# Patient Record
Sex: FEMALE | Race: WHITE | NOT HISPANIC OR LATINO
[De-identification: names, ages, dates, MRNs, and addresses within clinical notes are randomized per-mention and may not be internally consistent; named-entity substitution may affect disease eponyms.]

---

## 2021-02-04 PROBLEM — Z00.00 ENCOUNTER FOR PREVENTIVE HEALTH EXAMINATION: Status: ACTIVE | Noted: 2021-02-04

## 2021-02-18 ENCOUNTER — APPOINTMENT (OUTPATIENT)
Dept: NEUROSURGERY | Facility: CLINIC | Age: 76
End: 2021-02-18
Payer: MEDICARE

## 2021-02-18 VITALS — BODY MASS INDEX: 35.88 KG/M2 | WEIGHT: 195 LBS | HEIGHT: 62 IN

## 2021-02-18 DIAGNOSIS — Z86.69 PERSONAL HISTORY OF OTHER DISEASES OF THE NERVOUS SYSTEM AND SENSE ORGANS: ICD-10-CM

## 2021-02-18 DIAGNOSIS — Z63.5 DISRUPTION OF FAMILY BY SEPARATION AND DIVORCE: ICD-10-CM

## 2021-02-18 DIAGNOSIS — I10 ESSENTIAL (PRIMARY) HYPERTENSION: ICD-10-CM

## 2021-02-18 DIAGNOSIS — H93.A9 PULSATILE TINNITUS, UNSPECIFIED EAR: ICD-10-CM

## 2021-02-18 DIAGNOSIS — Z78.9 OTHER SPECIFIED HEALTH STATUS: ICD-10-CM

## 2021-02-18 DIAGNOSIS — Z92.3 PERSONAL HISTORY OF IRRADIATION: ICD-10-CM

## 2021-02-18 DIAGNOSIS — Z86.39 PERSONAL HISTORY OF OTHER ENDOCRINE, NUTRITIONAL AND METABOLIC DISEASE: ICD-10-CM

## 2021-02-18 PROCEDURE — 99203 OFFICE O/P NEW LOW 30 MIN: CPT | Mod: 95

## 2021-02-18 RX ORDER — LEVOTHYROXINE SODIUM 137 UG/1
TABLET ORAL
Refills: 0 | Status: ACTIVE | COMMUNITY

## 2021-02-18 RX ORDER — HYDROCHLOROTHIAZIDE 25 MG/1
25 TABLET ORAL
Refills: 0 | Status: ACTIVE | COMMUNITY

## 2021-02-18 RX ORDER — LOSARTAN POTASSIUM 50 MG/1
50 TABLET, FILM COATED ORAL
Refills: 0 | Status: ACTIVE | COMMUNITY

## 2021-02-18 RX ORDER — PITAVASTATIN CALCIUM 4.18 MG/1
TABLET, FILM COATED ORAL
Refills: 0 | Status: ACTIVE | COMMUNITY

## 2021-02-18 SDOH — SOCIAL STABILITY - SOCIAL INSECURITY: DISRUPTION OF FAMILY BY SEPARATION AND DIVORCE: Z63.5

## 2021-02-23 NOTE — REVIEW OF SYSTEMS
[As Noted in HPI] : as noted in HPI [Negative] : Heme/Lymph [de-identified] : Left Head Pressure [FreeTextEntry4] : Pulsatile Tinnitus

## 2021-02-23 NOTE — DATA REVIEWED
[de-identified] : M [de-identified] : Multiple Imaging Studies Reviewed via Monica Brito\par MRA Head 2019; No aVM or DAVF\par MRA Neck 2019: No stenosis\par MRI 2017: ? 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

## 2021-02-23 NOTE — ASSESSMENT
[FreeTextEntry1] : IMPRESSION:\par Pulsatile tinnitus\par \par We discussed possible causes of pulsatile tinnitus. These include:\par ENT causes such as semicircular canal dehiscence.\par Cardiac causes such as aortic stenosis, valve disease, heart murmur.\par Hypertension.\par Anemia.\par Thyroid disease.\par Cerebrovascular problems: arteriovenous malformation (AVM), dural arteriovenous fistula (DAVF), Venous sinus stenosis, venous aneurysm, large trans-mastoid emissary vein, carotid stenosis.\par Idiopathic Intracranial Hypertension\par \par MRA Neck and Head and MRV Head are unremarkable.\par \par I recommended MRA Head with and without contrast for further assessment (TRICKS)\par \par PLAN:\par 1. MRA Head with and without contrast (TRICKS) to rule out occult dural arteriovenous fistula\par 2. Follow-up with ENT regarding hearing aids

## 2021-02-23 NOTE — HISTORY OF PRESENT ILLNESS
[Home] : at home, [unfilled] , at the time of the visit. [Medical Office: ([facility])___] : at the medical office located in  [Verbal consent obtained from patient] : the patient, [unfilled] [de-identified] : Ms. FORRESTER is a pleasant, left-handed 75 year old female who presents today with a chief complaint of left ear pulsatile tinnitus.  It first started in 2009 after she had a colonoscopy.  She went to many MDs at that time and was only told to take Claritin.  Since that time it has been getting progressively worse.  It is described as a pulsating, buzzing sound that is in sync with her pulse.  \par \par Pressing behind her left ear improves the sound.\par Pressing in the left neck and turning to the left makes no difference \par \par Nothing makes it worse.  She was last seen seen by ENT in 2019, report shows some moderate SNHL.  It was recommended she try a hearing aid but she does not want to as she does not feel she has any trouble hearing.  \par \par Over the last 5 years she also has left sided head pressure that is constant, wakes her up at night.  She wakes up with a left sided headache most mornings that are relieved after she is up.  \par \par She denies blurry vision or diplopia.  She follows with opthalmology annually.\par \par How much is pulsatile tinnitus affecting your quality of life (0-10, 10 worst)? 8\par \par Bell's palsy 2017.